# Patient Record
Sex: MALE | URBAN - METROPOLITAN AREA
[De-identification: names, ages, dates, MRNs, and addresses within clinical notes are randomized per-mention and may not be internally consistent; named-entity substitution may affect disease eponyms.]

---

## 2017-01-04 ENCOUNTER — TRANSFERRED RECORDS (OUTPATIENT)
Dept: HEALTH INFORMATION MANAGEMENT | Facility: CLINIC | Age: 82
End: 2017-01-04

## 2017-01-16 ENCOUNTER — TRANSFERRED RECORDS (OUTPATIENT)
Dept: HEALTH INFORMATION MANAGEMENT | Facility: CLINIC | Age: 82
End: 2017-01-16

## 2017-03-21 ENCOUNTER — TRANSFERRED RECORDS (OUTPATIENT)
Dept: HEALTH INFORMATION MANAGEMENT | Facility: CLINIC | Age: 82
End: 2017-03-21

## 2017-04-14 ENCOUNTER — TRANSFERRED RECORDS (OUTPATIENT)
Dept: HEALTH INFORMATION MANAGEMENT | Facility: CLINIC | Age: 82
End: 2017-04-14

## 2017-08-02 ENCOUNTER — TRANSFERRED RECORDS (OUTPATIENT)
Dept: HEALTH INFORMATION MANAGEMENT | Facility: CLINIC | Age: 82
End: 2017-08-02

## 2017-08-07 ENCOUNTER — TRANSFERRED RECORDS (OUTPATIENT)
Dept: HEALTH INFORMATION MANAGEMENT | Facility: CLINIC | Age: 82
End: 2017-08-07

## 2017-08-11 ENCOUNTER — TRANSFERRED RECORDS (OUTPATIENT)
Dept: HEALTH INFORMATION MANAGEMENT | Facility: CLINIC | Age: 82
End: 2017-08-11

## 2017-10-06 ENCOUNTER — TRANSFERRED RECORDS (OUTPATIENT)
Dept: HEALTH INFORMATION MANAGEMENT | Facility: CLINIC | Age: 82
End: 2017-10-06

## 2017-10-16 ENCOUNTER — TRANSFERRED RECORDS (OUTPATIENT)
Dept: HEALTH INFORMATION MANAGEMENT | Facility: CLINIC | Age: 82
End: 2017-10-16

## 2017-11-22 ENCOUNTER — TRANSFERRED RECORDS (OUTPATIENT)
Dept: HEALTH INFORMATION MANAGEMENT | Facility: CLINIC | Age: 82
End: 2017-11-22

## 2018-06-13 ENCOUNTER — TRANSFERRED RECORDS (OUTPATIENT)
Dept: HEALTH INFORMATION MANAGEMENT | Facility: CLINIC | Age: 83
End: 2018-06-13

## 2018-07-06 ENCOUNTER — TRANSFERRED RECORDS (OUTPATIENT)
Dept: HEALTH INFORMATION MANAGEMENT | Facility: CLINIC | Age: 83
End: 2018-07-06

## 2018-07-25 ENCOUNTER — TRANSFERRED RECORDS (OUTPATIENT)
Dept: HEALTH INFORMATION MANAGEMENT | Facility: CLINIC | Age: 83
End: 2018-07-25

## 2018-07-27 ENCOUNTER — TRANSFERRED RECORDS (OUTPATIENT)
Dept: HEALTH INFORMATION MANAGEMENT | Facility: CLINIC | Age: 83
End: 2018-07-27

## 2018-10-11 ENCOUNTER — CARE COORDINATION (OUTPATIENT)
Dept: CARDIOLOGY | Facility: CLINIC | Age: 83
End: 2018-10-11

## 2018-10-11 NOTE — PROGRESS NOTES
Referral- Heart Failure    DEMOGRAPHICS       Demographic Information on Santosh Lezama:    Santosh Lezama  Gender: male  : 1933  [address]  [facility] [address]  [phone] (home)     Medical Record: [id number]  Social Security Number: (Not on file)  Pharmacy: Data Unavailable  Primary Care Provider: No primary care provider on file.    Parent's names are: Data Unavailable (mother) and Data Unavailable (father).    Insurance: Payor: MEDICARE / Plan: MEDICARE / Product Type: Medicare /       REFERRAL INFORMATION       Referring Provider    Referring Clinic    Referring Contact Info    Referring Diagnosis    Referring Records Req Records coming from [place].    Referring Urgency        [facility] Notes:   Patient called and is looking for a Heart Failure provider to take over care. He is sending records from [place] once received we will review and make appointment.   10/31/2018 - LVM asking pt to call back. Still have not received records. When leaving this VM I suggested he give me their contact info and I call them. Waiting for call back. I spoke to patient to him one time earlier and he was going to call his clinic and have them send it again.        PLAN:       Pt was self referred.  We had been waiting on records from [place] - have left messages with patient, but he has not followed up.  Will close the referral.

## 2018-10-15 ENCOUNTER — TELEPHONE (OUTPATIENT)
Dept: UROLOGY | Facility: CLINIC | Age: 83
End: 2018-10-15

## 2018-10-15 NOTE — TELEPHONE ENCOUNTER
Patient called and told him we do not have any records from his referring MD. Doris Sexton LPN Staff Nurse

## 2018-10-15 NOTE — TELEPHONE ENCOUNTER
[facility] Call Center    Phone Message    May a detailed message be left on voicemail: yes    Reason for Call: Other: Pt calling asking to speak to Dr. Orta's nurse. He wants to make sure we have everything ready for his appt on 12/5 and if we need anything else.     Action Taken: Message routed to:  [facility]

## 2018-11-15 ENCOUNTER — CARE COORDINATION (OUTPATIENT)
Dept: CARDIOLOGY | Facility: CLINIC | Age: 83
End: 2018-11-15

## 2018-12-04 NOTE — PROGRESS NOTES
Multiple attempts to contact patient and receive records has failed. Closing encounter.  Jcarlos Adams, [facility] Heart Failure Admin/Referrals

## 2019-01-17 ENCOUNTER — TELEPHONE (OUTPATIENT)
Dept: ORTHOPEDICS | Facility: CLINIC | Age: 84
End: 2019-01-17

## 2019-01-17 NOTE — TELEPHONE ENCOUNTER
[facility] Call Center    Phone Message    May a detailed message be left on voicemail: yes    Reason for Call: Other:  Pt requesting a call back from  nurse. Pt was scheduled 2/14/19 for SI irritation,Pt was offered to see ,pt was very upset noone called him to let him know provider is out and is requesting a call back from the nurse. [place] appt has been canceled        Action Taken: Message routed to:  [facility] ortho

## 2019-02-21 ENCOUNTER — TRANSFERRED RECORDS (OUTPATIENT)
Dept: HEALTH INFORMATION MANAGEMENT | Facility: CLINIC | Age: 84
End: 2019-02-21

## 2019-03-18 ENCOUNTER — PRE VISIT (OUTPATIENT)
Dept: ORTHOPEDICS | Facility: CLINIC | Age: 84
End: 2019-03-18

## 2019-03-18 NOTE — TELEPHONE ENCOUNTER
RECORDS RECEIVED FROM: in process   DATE RECEIVED: in process   NOTES STATUS DETAILS   OFFICE NOTE from referring provider In process [facility]   OFFICE NOTE from other specialist In process    DISCHARGE SUMMARY from hospital In process    DISCHARGE REPORT from the ER N/A    OPERATIVE REPORT In process    MEDICATION LIST In process    IMPLANT RECORD/STICKER N/A    LABS     CBC/DIFF In process    CULTURES N/A    INJECTIONS DONE IN RADIOLOGY In process    MRI Internal    CT SCAN N/A    XRAYS (IMAGES & REPORTS) In process    TUMOR     PATHOLOGY  Slides & report N/A

## 2019-03-27 DIAGNOSIS — M53.3 CHRONIC SI JOINT PAIN: Primary | ICD-10-CM

## 2019-03-27 DIAGNOSIS — G89.29 CHRONIC SI JOINT PAIN: Primary | ICD-10-CM

## 2019-03-28 ENCOUNTER — ANCILLARY PROCEDURE (OUTPATIENT)
Dept: GENERAL RADIOLOGY | Facility: CLINIC | Age: 84
End: 2019-03-28
Attending: ORTHOPAEDIC SURGERY
Payer: MEDICARE

## 2019-03-28 ENCOUNTER — OFFICE VISIT (OUTPATIENT)
Dept: ORTHOPEDICS | Facility: CLINIC | Age: 84
End: 2019-03-28
Payer: MEDICARE

## 2019-03-28 VITALS — HEIGHT: 70 IN | BODY MASS INDEX: 34.79 KG/M2 | WEIGHT: 243 LBS

## 2019-03-28 DIAGNOSIS — M51.369 DDD (DEGENERATIVE DISC DISEASE), LUMBAR: ICD-10-CM

## 2019-03-28 DIAGNOSIS — M53.3 CHRONIC SI JOINT PAIN: ICD-10-CM

## 2019-03-28 DIAGNOSIS — G89.29 CHRONIC SI JOINT PAIN: ICD-10-CM

## 2019-03-28 DIAGNOSIS — M53.3 SACROILIAC JOINT DYSFUNCTION: ICD-10-CM

## 2019-03-28 DIAGNOSIS — M40.30 FLATBACK SYNDROME, SITE UNSPECIFIED: ICD-10-CM

## 2019-03-28 DIAGNOSIS — M51.369 DDD (DEGENERATIVE DISC DISEASE), LUMBAR: Primary | ICD-10-CM

## 2019-03-28 DIAGNOSIS — M89.8X9 HETEROTOPIC OSSIFICATION OF BONE: ICD-10-CM

## 2019-03-28 DIAGNOSIS — M48.10 DISH (DIFFUSE IDIOPATHIC SKELETAL HYPEROSTOSIS): ICD-10-CM

## 2019-03-28 RX ORDER — HYDRALAZINE HYDROCHLORIDE 100 MG/1
100 TABLET, FILM COATED ORAL 3 TIMES DAILY
COMMUNITY
Start: 2019-02-12

## 2019-03-28 RX ORDER — TRAMADOL HYDROCHLORIDE 50 MG/1
50 TABLET ORAL EVERY 8 HOURS
COMMUNITY

## 2019-03-28 RX ORDER — CELECOXIB 100 MG/1
100 CAPSULE ORAL DAILY
COMMUNITY

## 2019-03-28 RX ORDER — FUROSEMIDE 40 MG
40 TABLET ORAL 2 TIMES DAILY
COMMUNITY

## 2019-03-28 RX ORDER — AMIODARONE HYDROCHLORIDE 200 MG/1
400 TABLET ORAL 2 TIMES DAILY
COMMUNITY
Start: 2019-01-21

## 2019-03-28 RX ORDER — DIAZEPAM 5 MG
TABLET ORAL
Qty: 2 TABLET | Refills: 0 | Status: SHIPPED | OUTPATIENT
Start: 2019-03-28

## 2019-03-28 RX ORDER — VALSARTAN 320 MG/1
320 TABLET ORAL DAILY
COMMUNITY
Start: 2019-01-21

## 2019-03-28 RX ORDER — SPIRONOLACTONE 25 MG/1
25 TABLET ORAL DAILY
COMMUNITY
Start: 2019-01-21

## 2019-03-28 RX ORDER — NITROGLYCERIN 0.4 MG/1
0.4 TABLET SUBLINGUAL EVERY 5 MIN PRN
COMMUNITY
End: 2019-03-28

## 2019-03-28 RX ORDER — CARVEDILOL 12.5 MG/1
12.5 TABLET ORAL 2 TIMES DAILY
COMMUNITY

## 2019-03-28 RX ORDER — ATORVASTATIN CALCIUM 20 MG/1
20 TABLET, FILM COATED ORAL AT BEDTIME
COMMUNITY
Start: 2019-01-19

## 2019-03-28 ASSESSMENT — MIFFLIN-ST. JEOR: SCORE: 1793.49

## 2019-03-28 NOTE — NURSING NOTE
"Reason For Visit:   Chief Complaint   Patient presents with     Pelvis - Eval/Assessment, Pain     Consult For     Left Sided SI irratation, Hx of left quad tear 5 years ago         Primary MD: No primary care provider on file.  Ref. MD:     ?  No  Currently working? No.  Work status?  Retired.  Date of injury: Unknown  Type of injury: NA  Date of surgery: 2016  Type of surgery:Hip Implant   Smoker: No  Request smoking cessation information: No    Ht 1.778 m (5' 10\")   Wt 110.2 kg (243 lb)   BMI 34.87 kg/m      Pain Assessment  Patient Currently in Pain: Yes  0-10 Pain Scale: 5  Primary Pain Location: Sacrum(left )  Pain Descriptors: Aching, Constant  Alleviating Factors: Rest(sitting)  Aggravating Factors: Movement, Exercise    Oswestry (ANDREAS) Questionnaire    OSWESTRY DISABILITY INDEX 3/28/2019   Count 9   Sum 23   Oswestry Score (%) 51.11            Neck Disability Index (NDI) Questionnaire    No flowsheet data found.           Visual Analog Pain Scale  Back Pain Scale 0-10: 5.5  Right leg pain: 0  Left leg pain: 4    Promis 10 Assessment    PROMIS 10 3/28/2019   In general, would you say your health is: Very good   In general, would you say your quality of life is: Good   In general, how would you rate your physical health? Fair   In general, how would you rate your mental health, including your mood and your ability to think? Very good   In general, how would you rate your satisfaction with your social activities and relationships? Good   In general, please rate how well you carry out your usual social activities and roles Fair   To what extent are you able to carry out your everyday physical activities such as walking, climbing stairs, carrying groceries, or moving a chair? Moderately   How often have you been bothered by emotional problems such as feeling anxious, depressed or irritable? Rarely   How would you rate your fatigue on average? Moderate   How would you rate your pain on average? "   0 = No Pain  to  10 = Worst Imaginable Pain 5   In general, would you say your health is: 4   In general, would you say your quality of life is: 3   In general, how would you rate your physical health? 2   In general, how would you rate your mental health, including your mood and your ability to think? 4   In general, how would you rate your satisfaction with your social activities and relationships? 3   In general, please rate how well you carry out your usual social activities and roles. (This includes activities at home, at work and in your community, and responsibilities as a parent, child, spouse, employee, friend, etc.) 2   To what extent are you able to carry out your everyday physical activities such as walking, climbing stairs, carrying groceries, or moving a chair? 3   In the past 7 days, how often have you been bothered by emotional problems such as feeling anxious, depressed, or irritable? 2   In the past 7 days, how would you rate your fatigue on average? 3   In the past 7 days, how would you rate your pain on average, where 0 means no pain, and 10 means worst imaginable pain? 5   Global Mental Health Score 14   Global Physical Health Score 11   PROMIS TOTAL - SUBSCORES 25   Some recent data might be hidden                Azra Gee, ATC

## 2019-03-28 NOTE — PATIENT INSTRUCTIONS
Has Valium script for MRI.  Needs RTC appt,. With  after the 2 tests & injection done.  Thanks.  Perlita ALONSO

## 2019-03-28 NOTE — PROGRESS NOTES
[facility]  Orthopedics  Aditya Henriquez MD  2019     Name: Santosh Lezama  MRN: [id number]  Age: 85 year old  : 1933  Referring provider: Provider Not In System     Chief Complaint: Left weakness, low back pain    Date of Injury: None    History of Present Illness:   Santosh Lezama is a 85 year old, male with history of heart failure, hyperlipidemia, hypertension with history of L2-4 decompression by Dr. lancaster in [place] who recently relocated to [place] who presents today for evaluation of left leg dysfunction and low back pain.  Reports years of low back pain, but since total hip arthroplasty in , he has had reat difficulty with strength in the left leg.  He says everything works below the knee, but he cannot lift his leg.  He denies any numbness.  He reports pain that radiates from his SI joint to the thigh.  It is worse with activity. Epidural injections to the lumbar spine with temporary benefit of only 2-3 weeks.  Per chart review he has had referral for SI joint injections but it was unclear whether he had these done.  He does not recall having them done.  He also had a left trochanteric bursal injection which again only gave him short-term temporary relief.  He says he underwent L3-4, L4-5 decompression 2018 by Dr. Borges (operative report in our system listed as progress note 3/19/19).  Per his understanding that this was to address the left leg weakness but he has noticed no improvement.  He is also not had any improvement in his back or thigh pain.  He last did physical therapy a year ago, prior to relocating to [place].     He moved to [place] after his wife passed away and he wanted to be closer to his children and grandchildren.  Since moving here he has been frustrated by his inability to participate in activities or play with his grandchildren.  His social life is significantly limited by leg dysfunction and pain.    Review of Systems:   A 10-point review of  "systems was obtained and is negative except for as noted in the HPI.     Allergies:  The patient reports no known allergies.    Past Medical History:  Heart failure  Hyperlipidemia  Hypertension  Had    Past Surgical History:  Right total knee arthroplasty 2014  Left total knee arthroplasty 2015, complicated by partial quad tear managed conservatively  Left CATIA 2016 (anterior approach)  L3-L4 microlaminotomy, L3-4, L4-5 microdecompression by Dr. 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    Physical Examination:  Height 1.778 m (5' 10\"), weight 110.2 kg (243 lb).   No acute distress, pleasant and cooperative  Hard of hearing  nonlabored breathing on room air       Lumbar Spine:    Appearance - No gross stepoffs or deformities    Pain replicated with palpation of left S1      Motor -     L2-3: Hip flexion 5/5 R and 4/5 L strength          L3/4:  Knee extension R 5/5 and L 4/5 strength         L4/5:  Foot dorsiflexion R 5/5 L 5/5 and       EHL dorsiflexion R 5/5 L 5/5 strength         S1:  Plantarflexion/Peroneal Muscles  R 5/5 and L 5/5 strength    Sensation: intact to light touch L3-S1 distribution BLE    Normal straight leg test bilaterally    SI:  Positive PHU test on left, mildly positive FAVER test on right  No recreation of pain with AP compression of pelvis    Hip Exam:  No tenderness over the greater trochanters.  Left hip with limited active and passive flexion. Feels like mechanical block in addition to limitation by pain    Imaging:   EOS Radiographs of the spine - AP and lateral views (03/28/2019)  Diffuse multilevel degenerative changes throughout thoracic and lumbar spine. Bridging osteophytes consistent with DISH affecting thoracic spine. Signficant calcification of great vessels. S/p left CATIA    PI 36  PT 19  LL 21  T10-L2 7  TK 49    XR Pelvis 3/28/19 with stable appearing CATIA though not fully visualized. " Position and alignment of components appear appropriate. Heterotopic ossification around greater trochanter, [place] Class III.       I have independently reviewed the above imaging studies; the results were discussed with the patient.     Assessment/Plan:   85 year old male with low back pain and perceived left hip weakness with multiple contributing factors including:    Increased thoracic kyphosis  Lumbar flatback  DISH of thoracic spine    May also have symptoms related to left SI joint pain, possible incomplete decompression of L3-L5, and/or heterotopic ossification of left hip causing restricted motion    We discussed each of these etiologies as possible contributors to his pain and left hip dysfunction. In order to better understand how each of these factors contribute, werecommend an MRI of his lumbar spine to evaluate for stenosis and nerve root compression.  We recommend a CT of his pelvis to better appreciate degeneration of his SI joint as well as the degree of heterotopic ossification.  Lastly we would recommend a CT-guided left sacroiliac joint injection for diagnostic and therapeutic purposes.    We ask that Santosh return to clinic once each of these studies are complete.    This patient was seen and discussed with Dr. Henriquez who is in agreement with this plan.    Brenda Pollock, PGY4    I saw and evaluated the patient and developed the plan.  Aditya Henriquez MD

## 2019-03-28 NOTE — LETTER
3/28/2019       RE: Santosh Lezama  [address]  [facility] [address]     Dear Colleague,    Thank you for referring your patient, Santosh Lezama, to the [facility]. Please see a copy of my visit note below.    [facility]  Orthopedics  Aditya Henriquez MD  2019     Name: Santosh Lezama  MRN: [id number]  Age: 85 year old  : 1933  Referring provider: Provider Not In System     Chief Complaint: Left weakness, low back pain    Date of Injury: None    History of Present Illness:   Santosh Lezama is a 85 year old, male with history of heart failure, hyperlipidemia, hypertension with history of L2-4 decompression by Dr. lancaster in [place] who recently relocated to [place] who presents today for evaluation of left leg dysfunction and low back pain.  Reports years of low back pain, but since total hip arthroplasty in , he has had reat difficulty with strength in the left leg.  He says everything works below the knee, but he cannot lift his leg.  He denies any numbness.  He reports pain that radiates from his SI joint to the thigh.  It is worse with activity. Epidural injections to the lumbar spine with temporary benefit of only 2-3 weeks.  Per chart review he has had referral for SI joint injections but it was unclear whether he had these done.  He does not recall having them done.  He also had a left trochanteric bursal injection which again only gave him short-term temporary relief.  He says he underwent L3-4, L4-5 decompression 2018 by Dr. Borges (operative report in our system listed as progress note 3/19/19).  Per his understanding that this was to address the left leg weakness but he has noticed no improvement.  He is also not had any improvement in his back or thigh pain.  He last did physical therapy a year ago, prior to relocating to [place].     He moved to [place] after his wife passed away and he wanted to  "be closer to his children and grandchildren.  Since moving here he has been frustrated by his inability to participate in activities or play with his grandchildren.  His social life is significantly limited by leg dysfunction and pain.    Review of Systems:   A 10-point review of systems was obtained and is negative except for as noted in the HPI.     Allergies:  The patient reports no known allergies.    Past Medical History:  Heart failure  Hyperlipidemia  Hypertension  Had    Past Surgical History:  Right total knee arthroplasty 2014  Left total knee arthroplasty 2015, complicated by partial quad tear managed conservatively  Left CATIA 2016 (anterior approach)  L3-L4 microlaminotomy, L3-4, L4-5 microdecompression by Dr. 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    Physical Examination:  Height 1.778 m (5' 10\"), weight 110.2 kg (243 lb).   No acute distress, pleasant and cooperative  Hard of hearing  nonlabored breathing on room air       Lumbar Spine:    Appearance - No gross stepoffs or deformities    Pain replicated with palpation of left S1      Motor -     L2-3: Hip flexion 5/5 R and 4/5 L strength          L3/4:  Knee extension R 5/5 and L 4/5 strength         L4/5:  Foot dorsiflexion R 5/5 L 5/5 and       EHL dorsiflexion R 5/5 L 5/5 strength         S1:  Plantarflexion/Peroneal Muscles  R 5/5 and L 5/5 strength    Sensation: intact to light touch L3-S1 distribution BLE    Normal straight leg test bilaterally    SI:  Positive PHU test on left, mildly positive FAVER test on right  No recreation of pain with AP compression of pelvis    Hip Exam:  No tenderness over the greater trochanters.  Left hip with limited active and passive flexion. Feels like mechanical block in addition to limitation by pain    Imaging:   EOS Radiographs of the spine - AP and lateral views (03/28/2019)  Diffuse multilevel degenerative " changes throughout thoracic and lumbar spine. Bridging osteophytes consistent with DISH affecting thoracic spine. Signficant calcification of great vessels. S/p left CATIA    PI 36  PT 19  LL 21  T10-L2 7  TK 49    XR Pelvis 3/28/19 with stable appearing CATIA though not fully visualized. Position and alignment of components appear appropriate. Heterotopic ossification around greater trochanter, [place] Class III.       I have independently reviewed the above imaging studies; the results were discussed with the patient.     Assessment/Plan:   85 year old male with low back pain and perceived left hip weakness with multiple contributing factors including:    Increased thoracic kyphosis  Lumbar flatback  DISH of thoracic spine    May also have symptoms related to left SI joint pain, possible incomplete decompression of L3-L5, and/or heterotopic ossification of left hip causing restricted motion    We discussed each of these etiologies as possible contributors to his pain and left hip dysfunction. In order to better understand how each of these factors contribute, werecommend an MRI of his lumbar spine to evaluate for stenosis and nerve root compression.  We recommend a CT of his pelvis to better appreciate degeneration of his SI joint as well as the degree of heterotopic ossification.  Lastly we would recommend a CT-guided left sacroiliac joint injection for diagnostic and therapeutic purposes.    We ask that Santosh return to clinic once each of these studies are complete.    This patient was seen and discussed with Dr. Henriquez who is in agreement with this plan.    Brenda Pollock, PGY4    I saw and evaluated the patient and developed the plan.      Again, thank you for allowing me to participate in the care of your patient.      Sincerely,    Aditya Henriquez MD

## 2019-04-25 ENCOUNTER — OFFICE VISIT (OUTPATIENT)
Dept: ORTHOPEDICS | Facility: CLINIC | Age: 84
End: 2019-04-25
Payer: MEDICARE

## 2019-04-25 VITALS — BODY MASS INDEX: 34.79 KG/M2 | WEIGHT: 243 LBS | HEIGHT: 70 IN

## 2019-04-25 DIAGNOSIS — M48.062 SPINAL STENOSIS OF LUMBAR REGION WITH NEUROGENIC CLAUDICATION: ICD-10-CM

## 2019-04-25 DIAGNOSIS — M40.30 FLATBACK SYNDROME, SITE UNSPECIFIED: ICD-10-CM

## 2019-04-25 DIAGNOSIS — M48.10 DISH (DIFFUSE IDIOPATHIC SKELETAL HYPEROSTOSIS): Primary | ICD-10-CM

## 2019-04-25 ASSESSMENT — MIFFLIN-ST. JEOR: SCORE: 1793.49

## 2019-04-25 NOTE — LETTER
"2019       RE: Santosh Lezama  [address]  [place] [facility] [address]     Dear Colleague,    Thank you for referring your patient, Santosh Lezama, to the [facility] at [facility]. Please see a copy of my visit note below.    [facility]  Orthopedics  Aditya Henriquez MD  2019     Name: Santosh Lezama  MRN: [id number]  Age: 85 year old  : 1933  Referring provider: Provider Not In System     Chief Complaint:   low back pain and perceived left hip weakness with multiple contributing factors including:  Increased thoracic kyphosis  Lumbar flatback  DISH of thoracic spine    History of Present Illness:   Santosh Lezama is a 85 year old male who presents today for follow-up regarding the above concerns. I last evaluated this patient on 19, at which time I recommended MRI of the lumbar spine to evaluate for stenosis and nerve root compression, CT of the pelvis to better appreciate degeneration of his SI joint and the degree of heterotopic ossification, and CT guided left sacroiliac joint injection for diagnostic and therapeutic purposes, please refer to that note for further details. Today, patient arrives ready to discuss results of the MRI and CTs.  In terms of the SI joint injection, he recalls 1 week of partial relief following the injection.  He continues to have inability to actively flex at his left hip.  He also describes pain radiating into the anterior thigh.  The symptoms are unchanged compared to previous exams.    Review of Systems:   A 10-point review of systems was obtained and is negative except for as noted in the HPI.     Physical Examination:  Height 1.778 m (5' 10\"), weight 110.2 kg (243 lb).  Very pleasant and cooperative, no acute distress.  Nonlabored breathing on room air.  Has walker and hand.    Focused exam of left leg with 3/5 strength of hip flexion, 5/5 knee extension, foot dorsiflexion, EHL.  Sensation intact " to light touch L3-S1.    Imaging:  MRI of the lumbar spine- 4/16/19  Notable for foraminal stenosis L3-4 bilaterally, left L4-5, L5-S1    CT of the lumbar spine 4/16/19  Heterotopic ossification around left total hip arthroplasty. No evidence of bridging bone. No evidence of implant malposition or loosening    I have independently reviewed the above imaging studies; the results were discussed with the patient.     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 May also have left SI pain and left hip stiffness as contributing factors.    Plan:   We had an extensive discussion with Santosh regarding his options.  If he is interested in pursuing surgical treatment, so we recommend either a 1 or 2 level TLIF.  We would expect this procedure to last around 4 hours.  Estimated blood loss would be around 500 cc.  Santosh is interested in pursuing surgery but wants to make sure, as do we, that he is healthy enough to undergo this procedure.  Therefore we will have him follow-up with his cardiologist to evaluate his overall medical optimization and fitness for surgery.  If he is cleared for surgery, we would recommend a series of selective nerve blocks, likely starting with left L3-4.  Would have the patient document the response and then proceed with left L4-5.  We may consider repeating these injections on the contralateral side as well.  We will use the patient's documented response to each injection as a guide for which levels to include in the fusion.  If the patient is not a suitable surgical candidate, we would favor continued attempts for weight loss as well as physical therapy.  Santosh is very appreciative of this discussion.  He understands the steps that we wish to take.  He will begin by being evaluated by his cardiologist and let us know the results.    This patient was seen and discussed with Dr. Aditya Henriquez who is in  agreement with this plan    Brenda Pollock, PGY 4  I saw and evaluated the patient and developed the plan.    Again, thank you for allowing me to participate in the care of your patient.      Sincerely,    Aditya Henriquez MD

## 2019-04-25 NOTE — PROGRESS NOTES
"[facility]  Orthopedics  Aditya Henriquez MD  2019     Name: Santosh Lezama  MRN: [id number]  Age: 85 year old  : 1933  Referring provider: Provider Not In System     Chief Complaint:   low back pain and perceived left hip weakness with multiple contributing factors including:  Increased thoracic kyphosis  Lumbar flatback  DISH of thoracic spine    History of Present Illness:   Santosh Lezama is a 85 year old male who presents today for follow-up regarding the above concerns. I last evaluated this patient on 19, at which time I recommended MRI of the lumbar spine to evaluate for stenosis and nerve root compression, CT of the pelvis to better appreciate degeneration of his SI joint and the degree of heterotopic ossification, and CT guided left sacroiliac joint injection for diagnostic and therapeutic purposes, please refer to that note for further details. Today, patient arrives ready to discuss results of the MRI and CTs.  In terms of the SI joint injection, he recalls 1 week of partial relief following the injection.  He continues to have inability to actively flex at his left hip.  He also describes pain radiating into the anterior thigh.  The symptoms are unchanged compared to previous exams.    Review of Systems:   A 10-point review of systems was obtained and is negative except for as noted in the HPI.     Physical Examination:  Height 1.778 m (5' 10\"), weight 110.2 kg (243 lb).  Very pleasant and cooperative, no acute distress.  Nonlabored breathing on room air.  Has walker and hand.    Focused exam of left leg with 3/5 strength of hip flexion, 5/5 knee extension, foot dorsiflexion, EHL.  Sensation intact to light touch L3-S1.    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 No evidence of bridging bone. No evidence of implant " malposition or loosening    I have independently reviewed the above imaging studies; the results were discussed with the patient.     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 May also have left SI pain and left hip stiffness as contributing factors.    Plan:   We had an extensive discussion with Satnosh regarding his options.  If he is interested in pursuing surgical treatment, so we recommend either a 1 or 2 level TLIF.  We would expect this procedure to last around 4 hours.  Estimated blood loss would be around 500 cc.  Santosh is interested in pursuing surgery but wants to make sure, as do we, that he is healthy enough to undergo this procedure.  Therefore we will have him follow-up with his cardiologist to evaluate his overall medical optimization and fitness for surgery.  If he is cleared for surgery, we would recommend a series of selective nerve blocks, likely starting with left L3-4.  Would have the patient document the response and then proceed with left L4-5.  We may consider repeating these injections on the contralateral side as well.  We will use the patient's documented response to each injection as a guide for which levels to include in the fusion.  If the patient is not a suitable surgical candidate, we would favor continued attempts for weight loss as well as physical therapy.  Santosh is very appreciative of this discussion.  He understands the steps that we wish to take.  He will begin by being evaluated by his cardiologist and let us know the results.    This patient was seen and discussed with Dr. Aditya Henriquez who is in agreement with this plan    Brenda Pollock, PGY 4  I saw and evaluated the patient and developed the plan.

## 2019-04-25 NOTE — NURSING NOTE
"Reason For Visit:   Chief Complaint   Patient presents with     Spine - Pain, RECHECK     RECHECK     results            Primary MD: No primary care provider on file.  Ref. MD:      ?  No  Currently working? No.  Work status?  Retired.  Date of injury: Unknown  Type of injury: NA  Date of surgery: 2016  Type of surgery:Hip Implant   Smoker: No  Request smoking cessation information: No        Ht 1.778 m (5' 10\")   Wt 110.2 kg (243 lb)   BMI 34.87 kg/m      Pain Assessment  Patient Currently in Pain: Yes  0-10 Pain Scale: 3  Primary Pain Location: Back  Other Pain Locations: thigh and superior to left knee  Pain Descriptors: Aching, Sharp, Shooting, Dull  Alleviating Factors: Rest(sitting)  Aggravating Factors: Standing, Walking, Movement    Oswestry (ANDREAS) Questionnaire    OSWESTRY DISABILITY INDEX 3/28/2019   Count 9   Sum 23   Oswestry Score (%) 51.11            Neck Disability Index (NDI) Questionnaire    No flowsheet data found.                Promis 10 Assessment    PROMIS 10 4/25/2019   In general, would you say your health is: Good   In general, would you say your quality of life is: Good   In general, how would you rate your physical health? Good   In general, how would you rate your mental health, including your mood and your ability to think? Good   In general, how would you rate your satisfaction with your social activities and relationships? Fair   In general, please rate how well you carry out your usual social activities and roles Good   To what extent are you able to carry out your everyday physical activities such as walking, climbing stairs, carrying groceries, or moving a chair? Moderately   How often have you been bothered by emotional problems such as feeling anxious, depressed or irritable? Rarely   How would you rate your fatigue on average? Moderate   How would you rate your pain on average?   0 = No Pain  to  10 = Worst Imaginable Pain 3   In general, would you say your " health is: 3   In general, would you say your quality of life is: 3   In general, how would you rate your physical health? 3   In general, how would you rate your mental health, including your mood and your ability to think? 3   In general, how would you rate your satisfaction with your social activities and relationships? 2   In general, please rate how well you carry out your usual social activities and roles. (This includes activities at home, at work and in your community, and responsibilities as a parent, child, spouse, employee, friend, etc.) 3   To what extent are you able to carry out your everyday physical activities such as walking, climbing stairs, carrying groceries, or moving a chair? 3   In the past 7 days, how often have you been bothered by emotional problems such as feeling anxious, depressed, or irritable? 2   In the past 7 days, how would you rate your fatigue on average? 3   In the past 7 days, how would you rate your pain on average, where 0 means no pain, and 10 means worst imaginable pain? 3   Global Mental Health Score 12   Global Physical Health Score 13   PROMIS TOTAL - SUBSCORES 25   Some recent data might be hidden                Azra Gee, ATC

## 2019-05-14 ENCOUNTER — TELEPHONE (OUTPATIENT)
Dept: ORTHOPEDICS | Facility: CLINIC | Age: 84
End: 2019-05-14

## 2019-05-14 NOTE — TELEPHONE ENCOUNTER
[facility] Call Center    Phone Message    May a detailed message be left on voicemail: yes    Reason for Call: Other: Pt requesting a call from nurse to discuss surgery with Dr. Henriquez. He did not wish to go into further detail. Please call when available.    Action Taken: Message routed to:  [facility]o

## 2019-05-14 NOTE — TELEPHONE ENCOUNTER
[facility] Call Center    Phone Message    May a detailed message be left on voicemail: yes    Reason for Call: Symptoms or Concerns     If patient has red-flag symptoms, warm transfer to triage line    Current symptom or concern: Lower right back pain    Symptoms have been present for:  2-3 day(s)    Has patient previously been seen for this? Yes    By : Dr. Henriquez    Date: 04/25    Are there any new or worsening symptoms? Yes: Increased pain. Patient is wanting to hear back from Dr. Henriquez's nurse today to discuss symptoms and surgery.      Action Taken: Message routed to:  [facility] Ortho

## 2019-05-14 NOTE — TELEPHONE ENCOUNTER
I called and spoke with patient and he told me that he doesn't know if he pulled a muscle or tweaked his back on his right side patient states he has been waiting for Dr. Henriquez's nurse to return his call so is wondering if he should go to urgent care. I told patient I would route message to Tasha to give him a call tomorrow. Recommended patient to go to urgent care as he is in great deal of pain.     geronimo smith, cma .    5-17-19: see phone messages.  I called pt. who stated he went to [facility] but in [facility] system on MOn 5-13-19 & caleld us below on Tues 5-14-19,.  [facility] did CT Right hip showing Inflammation Right hip & F/U with Primary MD who is in [place].  Pt. Is here in [place].   I advised he see hip specialist for opinion & sign MAURA to have hip CT sent to us.  Ordered consult & provided our ph# again to schedule.  Pt. States feeling better now & will F/U with primary MD.  Call back prn. Pt agreed.  Tasha Magdaleno RN./

## 2019-11-22 ENCOUNTER — APPOINTMENT (RX ONLY)
Dept: URBAN - METROPOLITAN AREA CLINIC 303 | Facility: CLINIC | Age: 84
Setting detail: DERMATOLOGY
End: 2019-11-22

## 2019-11-22 DIAGNOSIS — D18.0 HEMANGIOMA: ICD-10-CM

## 2019-11-22 DIAGNOSIS — B35.3 TINEA PEDIS: ICD-10-CM

## 2019-11-22 DIAGNOSIS — D22 MELANOCYTIC NEVI: ICD-10-CM

## 2019-11-22 DIAGNOSIS — L85.3 XEROSIS CUTIS: ICD-10-CM

## 2019-11-22 DIAGNOSIS — L82.1 OTHER SEBORRHEIC KERATOSIS: ICD-10-CM

## 2019-11-22 DIAGNOSIS — Z85.828 PERSONAL HISTORY OF OTHER MALIGNANT NEOPLASM OF SKIN: ICD-10-CM

## 2019-11-22 PROBLEM — D22.5 MELANOCYTIC NEVI OF TRUNK: Status: ACTIVE | Noted: 2019-11-22

## 2019-11-22 PROBLEM — D18.01 HEMANGIOMA OF SKIN AND SUBCUTANEOUS TISSUE: Status: ACTIVE | Noted: 2019-11-22

## 2019-11-22 PROCEDURE — ? PRESCRIPTION

## 2019-11-22 PROCEDURE — ? ADDITIONAL NOTES

## 2019-11-22 PROCEDURE — ? BIOPSY BY SHAVE METHOD

## 2019-11-22 PROCEDURE — ? COUNSELING

## 2019-11-22 PROCEDURE — 11102 TANGNTL BX SKIN SINGLE LES: CPT

## 2019-11-22 PROCEDURE — 99214 OFFICE O/P EST MOD 30 MIN: CPT | Mod: 25

## 2019-11-22 RX ORDER — KETOCONAZOLE 20 MG/G
CREAM TOPICAL
Qty: 1 | Refills: 5 | Status: ERX | COMMUNITY
Start: 2019-11-22

## 2019-11-22 RX ADMIN — KETOCONAZOLE: 20 CREAM TOPICAL at 16:19

## 2019-11-22 ASSESSMENT — LOCATION DETAILED DESCRIPTION DERM
LOCATION DETAILED: LEFT CLAVICULAR SKIN
LOCATION DETAILED: LEFT PROXIMAL DORSAL FOREARM
LOCATION DETAILED: RIGHT PROXIMAL DORSAL FOREARM
LOCATION DETAILED: INFERIOR THORACIC SPINE
LOCATION DETAILED: LEFT SUPERIOR MEDIAL MIDBACK
LOCATION DETAILED: LEFT MEDIAL SUPERIOR CHEST
LOCATION DETAILED: RIGHT DISTAL PRETIBIAL REGION
LOCATION DETAILED: LEFT LOWER CUTANEOUS LIP
LOCATION DETAILED: LEFT SUPERIOR FOREHEAD
LOCATION DETAILED: EPIGASTRIC SKIN
LOCATION DETAILED: LEFT DISTAL PRETIBIAL REGION

## 2019-11-22 ASSESSMENT — LOCATION SIMPLE DESCRIPTION DERM
LOCATION SIMPLE: LEFT LOWER BACK
LOCATION SIMPLE: LEFT CLAVICULAR SKIN
LOCATION SIMPLE: ABDOMEN
LOCATION SIMPLE: LEFT FOREARM
LOCATION SIMPLE: LEFT LIP
LOCATION SIMPLE: CHEST
LOCATION SIMPLE: RIGHT FOREARM
LOCATION SIMPLE: LEFT FOREHEAD
LOCATION SIMPLE: RIGHT PRETIBIAL REGION
LOCATION SIMPLE: LEFT PRETIBIAL REGION
LOCATION SIMPLE: UPPER BACK

## 2019-11-22 ASSESSMENT — LOCATION ZONE DERM
LOCATION ZONE: LIP
LOCATION ZONE: ARM
LOCATION ZONE: LEG
LOCATION ZONE: TRUNK
LOCATION ZONE: FACE

## 2019-11-22 NOTE — PROCEDURE: BIOPSY BY SHAVE METHOD
X Size Of Lesion In Cm: 0
Bill For Surgical Tray: no
Biopsy Type: H and E
Electrodesiccation Text: The wound bed was treated with electrodesiccation after the biopsy was performed.
Depth Of Biopsy: dermis
Notification Instructions: Patient will be notified of biopsy results. However, patient instructed to call the office if not contacted within 2 weeks.
Electrodesiccation And Curettage Text: The wound bed was treated with electrodesiccation and curettage after the biopsy was performed.
Consent: Written consent was obtained and risks were reviewed including but not limited to scarring, infection, bleeding, scabbing, incomplete removal, nerve damage and allergy to anesthesia.
Anesthesia Volume In Cc (Will Not Render If 0): 0.5
Type Of Destruction Used: Curettage
Dressing: bandage
Was A Bandage Applied: Yes
Silver Nitrate Text: The wound bed was treated with silver nitrate after the biopsy was performed.
Curettage Text: The wound bed was treated with curettage after the biopsy was performed.
Lab: 6
Biopsy Method: Personna blade
Hemostasis: Drysol
Detail Level: Detailed
Billing Type: Third-Party Bill
Lab Facility: 3
Post-Care Instructions: I reviewed with the patient in detail post-care instructions. Patient is to keep the biopsy site dry overnight, and then apply bacitracin twice daily until healed. Patient may apply hydrogen peroxide soaks to remove any crusting.
Size Of Lesion In Cm: 0.6
Anesthesia Type: 1% lidocaine with 1:100,000 epinephrine and a 1:10 solution of 8.4% sodium bicarbonate
Cryotherapy Text: The wound bed was treated with cryotherapy after the biopsy was performed.
Wound Care: Petrolatum

## 2020-05-28 ENCOUNTER — APPOINTMENT (RX ONLY)
Dept: URBAN - METROPOLITAN AREA CLINIC 303 | Facility: CLINIC | Age: 85
Setting detail: DERMATOLOGY
End: 2020-05-28

## 2020-05-28 VITALS — TEMPERATURE: 97.1 F

## 2020-05-28 DIAGNOSIS — L82.1 OTHER SEBORRHEIC KERATOSIS: ICD-10-CM

## 2020-05-28 DIAGNOSIS — D18.0 HEMANGIOMA: ICD-10-CM

## 2020-05-28 DIAGNOSIS — Z85.828 PERSONAL HISTORY OF OTHER MALIGNANT NEOPLASM OF SKIN: ICD-10-CM

## 2020-05-28 DIAGNOSIS — L85.3 XEROSIS CUTIS: ICD-10-CM

## 2020-05-28 DIAGNOSIS — L81.4 OTHER MELANIN HYPERPIGMENTATION: ICD-10-CM

## 2020-05-28 PROBLEM — D18.01 HEMANGIOMA OF SKIN AND SUBCUTANEOUS TISSUE: Status: ACTIVE | Noted: 2020-05-28

## 2020-05-28 PROCEDURE — ? SUNSCREEN RECOMMENDATIONS

## 2020-05-28 PROCEDURE — 99214 OFFICE O/P EST MOD 30 MIN: CPT

## 2020-05-28 PROCEDURE — ? PRESCRIPTION MEDICATION MANAGEMENT

## 2020-05-28 PROCEDURE — ? ADDITIONAL NOTES

## 2020-05-28 PROCEDURE — ? FULL BODY SKIN EXAM

## 2020-05-28 PROCEDURE — ? COUNSELING

## 2020-05-28 ASSESSMENT — LOCATION DETAILED DESCRIPTION DERM
LOCATION DETAILED: RIGHT INFERIOR LATERAL MIDBACK
LOCATION DETAILED: PERIUMBILICAL SKIN
LOCATION DETAILED: EPIGASTRIC SKIN

## 2020-05-28 ASSESSMENT — LOCATION ZONE DERM: LOCATION ZONE: TRUNK

## 2020-05-28 ASSESSMENT — LOCATION SIMPLE DESCRIPTION DERM
LOCATION SIMPLE: ABDOMEN
LOCATION SIMPLE: RIGHT LOWER BACK

## 2020-05-28 NOTE — PROCEDURE: MIPS QUALITY
Quality 110: Preventive Care And Screening: Influenza Immunization: Influenza Immunization Administered during Influenza season
Quality 226: Preventive Care And Screening: Tobacco Use: Screening And Cessation Intervention: Patient screened for tobacco use and is an ex/non-smoker
Detail Level: Detailed
Quality 111:Pneumonia Vaccination Status For Older Adults: Pneumococcal Vaccination Previously Received
Quality 130: Documentation Of Current Medications In The Medical Record: Current Medications with Name, Dosage, Frequency, or Route not Documented, Reason not Given
Quality 431: Preventive Care And Screening: Unhealthy Alcohol Use - Screening: Patient screened for unhealthy alcohol use using a single question and scores less than 2 times per year

## 2020-05-28 NOTE — HPI: DRY SKIN
How Severe Is Your Dry Skin?: mild
Additional History: Gets more swelling in left leg, which has drier skin.

## 2020-05-28 NOTE — PROCEDURE: PRESCRIPTION MEDICATION MANAGEMENT
Otc Regimen: Moisturizing creams
Samples Given: Moisturizers
Render In Strict Bullet Format?: No
Detail Level: Zone

## 2020-11-03 ENCOUNTER — TELEPHONE (OUTPATIENT)
Dept: ORTHOPEDICS | Facility: CLINIC | Age: 85
End: 2020-11-03

## 2020-11-03 NOTE — TELEPHONE ENCOUNTER
[facility] Call Center    Phone Message    May a detailed message be left on voicemail: yes     Reason for Call: Other: Pt of Dr. Henriquez calling in stating he is having severe SI pain and declined next available of 01/21/21, Pt wants to get in soon, says the pain is bad and would like to maybe discuss surgery.  Pt is requesting a call back to be seen soon please     Action Taken: Message routed to:  [facility]    Travel Screening: Not Applicable

## 2020-11-03 NOTE — TELEPHONE ENCOUNTER
Writer called and got pt scheduled for in person visit with provider on 11/25/2020.     Melissa Barrios LPN

## 2020-11-04 ENCOUNTER — TELEPHONE (OUTPATIENT)
Dept: ORTHOPEDICS | Facility: CLINIC | Age: 85
End: 2020-11-04

## 2020-11-04 NOTE — TELEPHONE ENCOUNTER
[facility] Call Center    Phone Message    May a detailed message be left on voicemail: yes     Reason for Call: Other: Patient was told he would be receiving directions via email from care team. He was hoping to have somebody follow up with him about them, and hopefully get them sent out. Please call patient back to discuss if necessary.     Action Taken: Message routed to:  [facility]    Travel Screening: Not Applicable

## 2020-11-04 NOTE — TELEPHONE ENCOUNTER
Writer called and informed pt will try to   re-email the [place] map and directions to the [facility]. email sent to [email or web address]    Melissa Barrios LPN

## 2020-11-20 DIAGNOSIS — M53.3 SACROILIAC JOINT DYSFUNCTION: Primary | ICD-10-CM

## 2020-11-20 DIAGNOSIS — M48.062 SPINAL STENOSIS OF LUMBAR REGION WITH NEUROGENIC CLAUDICATION: ICD-10-CM

## 2020-11-23 ENCOUNTER — TELEPHONE (OUTPATIENT)
Dept: ORTHOPEDICS | Facility: CLINIC | Age: 85
End: 2020-11-23

## 2020-11-23 NOTE — TELEPHONE ENCOUNTER
I called the patient back to let him know that Dr. Henriquez needs updated images since he hasn't been seen for over an year. He is wondering why he needs this when the only thing he wants addressed is his SI joint. He said he will get the Xrays. He will check in on the first floor for these when he arrives. I also went over directions to get to the building as well as emailed the directions to him.    Lindsey Schmitz, ATC

## 2020-11-23 NOTE — TELEPHONE ENCOUNTER
[facility] Call Center    Phone Message    May a detailed message be left on voicemail: yes     Reason for Call: Other: Pt is scheduled for XRs, please call patient to discuss the reason for the XR orders.     Action Taken: Message routed to:  [facility]o    Travel Screening: Not Applicable

## 2020-11-25 ENCOUNTER — ANCILLARY PROCEDURE (OUTPATIENT)
Dept: GENERAL RADIOLOGY | Facility: CLINIC | Age: 85
End: 2020-11-25
Attending: ORTHOPAEDIC SURGERY
Payer: MEDICARE

## 2020-11-25 ENCOUNTER — OFFICE VISIT (OUTPATIENT)
Dept: ORTHOPEDICS | Facility: CLINIC | Age: 85
End: 2020-11-25
Payer: MEDICARE

## 2020-11-25 VITALS — WEIGHT: 240 LBS | HEIGHT: 67 IN | BODY MASS INDEX: 37.67 KG/M2

## 2020-11-25 DIAGNOSIS — M53.3 SACROILIAC JOINT PAIN: ICD-10-CM

## 2020-11-25 DIAGNOSIS — M43.20 FUSION OF SPINE, UNSPECIFIED SPINAL REGION: Primary | ICD-10-CM

## 2020-11-25 DIAGNOSIS — G57.00 PIRIFORMIS SYNDROME, UNSPECIFIED LATERALITY: Primary | ICD-10-CM

## 2020-11-25 PROCEDURE — 72082 X-RAY EXAM ENTIRE SPI 2/3 VW: CPT | Performed by: STUDENT IN AN ORGANIZED HEALTH CARE EDUCATION/TRAINING PROGRAM

## 2020-11-25 PROCEDURE — 99213 OFFICE O/P EST LOW 20 MIN: CPT | Performed by: ORTHOPAEDIC SURGERY

## 2020-11-25 PROCEDURE — 77073 BONE LENGTH STUDIES: CPT | Performed by: STUDENT IN AN ORGANIZED HEALTH CARE EDUCATION/TRAINING PROGRAM

## 2020-11-25 PROCEDURE — 72190 X-RAY EXAM OF PELVIS: CPT | Mod: GC | Performed by: RADIOLOGY

## 2020-11-25 ASSESSMENT — MIFFLIN-ST. JEOR: SCORE: 1721.13

## 2020-11-25 NOTE — LETTER
11/25/2020         RE: Santosh Lezama  [address]  [place] [address]        Dear Colleague,    Thank you for referring your patient, Santosh Lezama, to the [facility] [place]. Please see a copy of my visit note below.    FOLLOWUP EVALUATION      HISTORY OF PRESENT ILLNESS:  Santosh Lezama returns having had some interval activities going on.  He was back in [place] in January and February of this year and underwent a left SI fusion by Dr. Jcarlos Yeager.  He feels like this has helped.  He could not remember the hospital where he had this procedure done, but he did say that he stayed about 3 days.  He presents today complaining mainly of right buttock pain which he rates as an 8.  His Oswestry score is 42.  He ambulates with a rolling walker.  Of note is he has left hip flexor weakness such that he has a nonreciprocating gait on stairs.      PHYSICAL EXAMINATION:  Exam shows a well-developed male appearing his stated age who ambulates with a walker.  Evaluation of his stance shows good coronal alignment.  He has positive sagittal forward alignment with what appears to be a higher thoracic kyphosis.  Evaluation of his pain generators shows that he is more tender over the posterior inferior SI joint than he is at the posterior superior iliac spine.  He is mildly tender along the course of the piriformis.  Minimal tenderness over the greater trochanter.  SI joint provocation testing including log roll, thigh thrust, pelvic gapping, pelvic compression all equivocal to nonpainful.  Gaenslen gave anterior thigh pain.      IMAGING:  Imaging performed today includes a Hernandez view of the pelvis as well as spine films.  The Hernandez view of the pelvis shows the iliosacral screws on the left side.  No evidence of halo formation.  I do not recognize these particular screws and whether they are ingrowth screws or just fixation devices.  There is also a left total hip present.  He  has slight superior joint space narrowing in the right hip.  He has marked spondylosis in his lumbar spine and he has positive sagittal balance with loss of lumbar lordosis, increased thoracic kyphosis and a forward head thrust.  He has undergone bilateral total knee replacement as well.      ASSESSMENT:  Right buttock pain, unclear etiology.      PLAN:  I would like to have him see Dr. Wm Pak from [facility] to help sort this out.  I think he will probably require a series of diagnostic injections including his SI joint, possibly his intraarticular hip and possibly a piriformis block to sort out where this pain is coming from.  I am less convinced that it is SI based on the provocation maneuvers not really reproducing his symptoms.  Depending on what Dr. Pak finds, I am happy to see him back to talk about whether or not there are potentially surgical options to treat this.      Total contact time for this visit was greater than 15 minutes of which greater than 50% was spent in counseling about and coordinating his care.         Aditya Henriquez MD

## 2020-11-25 NOTE — NURSING NOTE
"Reason For Visit:   Chief Complaint   Patient presents with     RECHECK     right SI joint pain        Primary MD: No primary care provider on file.  Ref. MD: Est    ?  No  Currently working? No.  Work status?  Retired.    Date of injury: Unknown  Type of injury: NA    Date of surgery: 2016  Type of surgery:Hip Implant     Jan/Feb 2020 Left SI fusion [place]      Smoker: No  Request smoking cessation information: No    Ht 1.7 m (5' 6.93\")   Wt 108.9 kg (240 lb)   BMI 37.67 kg/m      Pain Assessment  Patient Currently in Pain: Yes  0-10 Pain Scale: 8  Primary Pain Location: Back    Oswestry (ANDREAS) Questionnaire    OSWESTRY DISABILITY INDEX 11/25/2020   Count 9   Sum 19   Oswestry Score (%) 42.22            Neck Disability Index (NDI) Questionnaire    No flowsheet data found.           Visual Analog Pain Scale  Back Pain Scale 0-10: 8  Right leg pain: 8  Left leg pain: 0  Neck Pain Scale 0-10: 0  Right arm pain: 0  Left arm pain: 0    Promis 10 Assessment    PROMIS 10 11/25/2020   In general, would you say your health is: Good   In general, would you say your quality of life is: Good   In general, how would you rate your physical health? Good   In general, how would you rate your mental health, including your mood and your ability to think? Very good   In general, how would you rate your satisfaction with your social activities and relationships? Good   In general, please rate how well you carry out your usual social activities and roles Good   To what extent are you able to carry out your everyday physical activities such as walking, climbing stairs, carrying groceries, or moving a chair? Moderately   How often have you been bothered by emotional problems such as feeling anxious, depressed or irritable? Never   How would you rate your fatigue on average? Moderate   How would you rate your pain on average?   0 = No Pain  to  10 = Worst Imaginable Pain 8   In general, would you say your health is: 3   In " general, would you say your quality of life is: 3   In general, how would you rate your physical health? 3   In general, how would you rate your mental health, including your mood and your ability to think? 4   In general, how would you rate your satisfaction with your social activities and relationships? 3   In general, please rate how well you carry out your usual social activities and roles. (This includes activities at home, at work and in your community, and responsibilities as a parent, child, spouse, employee, friend, etc.) 3   To what extent are you able to carry out your everyday physical activities such as walking, climbing stairs, carrying groceries, or moving a chair? 3   In the past 7 days, how often have you been bothered by emotional problems such as feeling anxious, depressed, or irritable? 1   In the past 7 days, how would you rate your fatigue on average? 3   In the past 7 days, how would you rate your pain on average, where 0 means no pain, and 10 means worst imaginable pain? 8   Global Mental Health Score 15   Global Physical Health Score 11   PROMIS TOTAL - SUBSCORES 26   Some recent data might be hidden                Melissa Barrios LPN

## 2020-11-25 NOTE — PROGRESS NOTES
FOLLOWUP EVALUATION      HISTORY OF PRESENT ILLNESS:  Santosh Lezama returns having had some interval activities going on.  He was back in [place] in January and February of this year and underwent a left SI fusion by Dr. Jcarlos Yeager.  He feels like this has helped.  He could not remember the hospital where he had this procedure done, but he did say that he stayed about 3 days.  He presents today complaining mainly of right buttock pain which he rates as an 8.  His Oswestry score is 42.  He ambulates with a rolling walker.  Of note is he has left hip flexor weakness such that he has a nonreciprocating gait on stairs.      PHYSICAL EXAMINATION:  Exam shows a well-developed male appearing his stated age who ambulates with a walker.  Evaluation of his stance shows good coronal alignment.  He has positive sagittal forward alignment with what appears to be a higher thoracic kyphosis.  Evaluation of his pain generators shows that he is more tender over the posterior inferior SI joint than he is at the posterior superior iliac spine.  He is mildly tender along the course of the piriformis.  Minimal tenderness over the greater trochanter.  SI joint provocation testing including log roll, thigh thrust, pelvic gapping, pelvic compression all equivocal to nonpainful.  Gaenslen gave anterior thigh pain.      IMAGING:  Imaging performed today includes a Hernandez view of the pelvis as well as spine films.  The Hernandez view of the pelvis shows the iliosacral screws on the left side.  No evidence of halo formation.  I do not recognize these particular screws and whether they are ingrowth screws or just fixation devices.  There is also a left total hip present.  He has slight superior joint space narrowing in the right hip.  He has marked spondylosis in his lumbar spine and he has positive sagittal balance with loss of lumbar lordosis, increased thoracic kyphosis and a forward head thrust.  He has undergone bilateral total knee  replacement as well.      ASSESSMENT:  Right buttock pain, unclear etiology.      PLAN:  I would like to have him see Dr. Wm Pak from [facility] to help sort this out.  I think he will probably require a series of diagnostic injections including his SI joint, possibly his intraarticular hip and possibly a piriformis block to sort out where this pain is coming from.  I am less convinced that it is SI based on the provocation maneuvers not really reproducing his symptoms.  Depending on what Dr. Pak finds, I am happy to see him back to talk about whether or not there are potentially surgical options to treat this.      Total contact time for this visit was greater than 15 minutes of which greater than 50% was spent in counseling about and coordinating his care.

## 2020-12-02 ENCOUNTER — TELEPHONE (OUTPATIENT)
Dept: PHYSICAL MEDICINE AND REHAB | Facility: CLINIC | Age: 85
End: 2020-12-02

## 2020-12-02 NOTE — TELEPHONE ENCOUNTER
VM left asking for a return call.       Referral placed to PMR clinic. The patient can be scheduled with Dr. Pak- as a new patient-in person appointment.

## 2020-12-07 NOTE — TELEPHONE ENCOUNTER
Discussed scheduling an appointment with the patient. He declined at this time. He would like to wait to start anything new until after a COVID vaccine is available. Will close encounter at this time.